# Patient Record
Sex: FEMALE | Race: WHITE | URBAN - METROPOLITAN AREA
[De-identification: names, ages, dates, MRNs, and addresses within clinical notes are randomized per-mention and may not be internally consistent; named-entity substitution may affect disease eponyms.]

---

## 2017-07-27 ENCOUNTER — HOSPITAL LABORATORY (OUTPATIENT)
Dept: OTHER | Facility: CLINIC | Age: 52
End: 2017-07-27

## 2017-07-27 ENCOUNTER — HOSPITAL ENCOUNTER (OUTPATIENT)
Dept: LAB | Facility: CLINIC | Age: 52
Discharge: HOME | End: 2017-07-27
Attending: ORTHOPAEDIC SURGERY | Admitting: ORTHOPAEDIC SURGERY
Payer: COMMERCIAL

## 2017-07-27 DIAGNOSIS — Z01.812 PRE-OPERATIVE LABORATORY EXAMINATION: ICD-10-CM

## 2017-07-27 LAB
CREAT SERPL-MCNC: 0.79 MG/DL (ref 0.52–1.04)
ERYTHROCYTE [DISTWIDTH] IN BLOOD BY AUTOMATED COUNT: 13.1 % (ref 10–15)
FERRITIN SERPL-MCNC: 60 NG/ML (ref 8–252)
GFR SERPL CREATININE-BSD FRML MDRD: 77 ML/MIN/1.7M2
HCT VFR BLD AUTO: 38.3 % (ref 35–47)
HGB BLD-MCNC: 12.6 G/DL (ref 11.7–15.7)
HGB BLD-MCNC: 12.8 G/DL (ref 11.7–15.7)
IRON SATN MFR SERPL: 26 % (ref 15–46)
IRON SERPL-MCNC: 74 UG/DL (ref 35–180)
MCH RBC QN AUTO: 31.3 PG (ref 26.5–33)
MCHC RBC AUTO-ENTMCNC: 33.4 G/DL (ref 31.5–36.5)
MCV RBC AUTO: 94 FL (ref 78–100)
PLATELET # BLD AUTO: 277 10E9/L (ref 150–450)
RBC # BLD AUTO: 4.09 10E12/L (ref 3.8–5.2)
RETICS # AUTO: 56.9 10E9/L (ref 25–95)
RETICS/RBC NFR AUTO: 1.4 % (ref 0.5–2)
TIBC SERPL-MCNC: 286 UG/DL (ref 240–430)
WBC # BLD AUTO: 6.1 10E9/L (ref 4–11)

## 2017-07-27 PROCEDURE — 87640 STAPH A DNA AMP PROBE: CPT | Performed by: ORTHOPAEDIC SURGERY

## 2017-07-27 PROCEDURE — 40000829 ZZHCL STATISTIC STAPH AUREUS SUSCEPT SCREEN PCR: Performed by: ORTHOPAEDIC SURGERY

## 2017-07-27 PROCEDURE — 87641 MR-STAPH DNA AMP PROBE: CPT | Performed by: ORTHOPAEDIC SURGERY

## 2017-07-27 PROCEDURE — 40000830 ZZHCL STATISTIC STAPH AUREUS METH RESIST SCREEN PCR: Performed by: ORTHOPAEDIC SURGERY

## 2017-07-28 LAB
MRSA DNA SPEC QL NAA+PROBE: NORMAL
SPECIMEN SOURCE: NORMAL

## 2017-08-07 RX ORDER — FERROUS SULFATE 325(65) MG
325 TABLET ORAL EVERY OTHER DAY
COMMUNITY

## 2017-08-21 NOTE — PHARMACY-ADMISSION MEDICATION HISTORY
Pharmacy reviewed prior to admission med list from pre-admitting rn, BONNIE Carrasco.       Prior to Admission medications    Medication Sig Last Dose Taking? Auth Provider   Naproxen Sodium (ALEVE PO) Take 220 mg by mouth 4 times daily as needed for moderate pain  Yes Reported, Patient   ferrous sulfate (IRON) 325 (65 FE) MG tablet Take 325 mg by mouth every other day  Yes Reported, Patient   Ascorbic Acid (VITAMIN C PO) Take 250 mg by mouth every other day   Yes Reported, Patient

## 2017-08-25 ENCOUNTER — HOSPITAL ENCOUNTER (OUTPATIENT)
Dept: LAB | Facility: CLINIC | Age: 52
Discharge: HOME | End: 2017-08-25
Attending: ORTHOPAEDIC SURGERY | Admitting: ORTHOPAEDIC SURGERY
Payer: COMMERCIAL

## 2017-08-25 DIAGNOSIS — Z01.812 PRE-OPERATIVE LABORATORY EXAMINATION: ICD-10-CM

## 2017-08-25 LAB
ABO + RH BLD: NORMAL
ABO + RH BLD: NORMAL
BLD GP AB SCN SERPL QL: NORMAL
BLOOD BANK CMNT PATIENT-IMP: NORMAL
SPECIMEN EXP DATE BLD: NORMAL

## 2017-08-25 PROCEDURE — 86900 BLOOD TYPING SEROLOGIC ABO: CPT | Performed by: ORTHOPAEDIC SURGERY

## 2017-08-25 PROCEDURE — 36415 COLL VENOUS BLD VENIPUNCTURE: CPT | Performed by: ORTHOPAEDIC SURGERY

## 2017-08-25 PROCEDURE — 86850 RBC ANTIBODY SCREEN: CPT | Performed by: ORTHOPAEDIC SURGERY

## 2017-08-25 PROCEDURE — 86901 BLOOD TYPING SEROLOGIC RH(D): CPT | Performed by: ORTHOPAEDIC SURGERY

## 2017-08-28 ENCOUNTER — HOSPITAL ENCOUNTER (INPATIENT)
Facility: CLINIC | Age: 52
LOS: 2 days | Discharge: HOME | DRG: 470 | End: 2017-08-30
Attending: ORTHOPAEDIC SURGERY | Admitting: ORTHOPAEDIC SURGERY
Payer: COMMERCIAL

## 2017-08-28 ENCOUNTER — APPOINTMENT (OUTPATIENT)
Dept: GENERAL RADIOLOGY | Facility: CLINIC | Age: 52
DRG: 470 | End: 2017-08-28
Attending: ORTHOPAEDIC SURGERY
Payer: COMMERCIAL

## 2017-08-28 ENCOUNTER — ANESTHESIA EVENT (OUTPATIENT)
Dept: SURGERY | Facility: CLINIC | Age: 52
DRG: 470 | End: 2017-08-28
Payer: COMMERCIAL

## 2017-08-28 ENCOUNTER — ANESTHESIA (OUTPATIENT)
Dept: SURGERY | Facility: CLINIC | Age: 52
DRG: 470 | End: 2017-08-28
Payer: COMMERCIAL

## 2017-08-28 DIAGNOSIS — Z96.641 S/P TOTAL HIP ARTHROPLASTY, RIGHT: Primary | ICD-10-CM

## 2017-08-28 PROBLEM — Z96.649 S/P TOTAL HIP ARTHROPLASTY: Status: ACTIVE | Noted: 2017-08-28

## 2017-08-28 PROCEDURE — 25000128 H RX IP 250 OP 636: Performed by: NURSE ANESTHETIST, CERTIFIED REGISTERED

## 2017-08-28 PROCEDURE — 36000093 ZZH SURGERY LEVEL 4 1ST 30 MIN: Performed by: ORTHOPAEDIC SURGERY

## 2017-08-28 PROCEDURE — 27210794 ZZH OR GENERAL SUPPLY STERILE: Performed by: ORTHOPAEDIC SURGERY

## 2017-08-28 PROCEDURE — 25000125 ZZHC RX 250: Performed by: NURSE ANESTHETIST, CERTIFIED REGISTERED

## 2017-08-28 PROCEDURE — 0SR904A REPLACEMENT OF RIGHT HIP JOINT WITH CERAMIC ON POLYETHYLENE SYNTHETIC SUBSTITUTE, UNCEMENTED, OPEN APPROACH: ICD-10-PCS | Performed by: ORTHOPAEDIC SURGERY

## 2017-08-28 PROCEDURE — 71000012 ZZH RECOVERY PHASE 1 LEVEL 1 FIRST HR: Performed by: ORTHOPAEDIC SURGERY

## 2017-08-28 PROCEDURE — 37000008 ZZH ANESTHESIA TECHNICAL FEE, 1ST 30 MIN: Performed by: ORTHOPAEDIC SURGERY

## 2017-08-28 PROCEDURE — 25000132 ZZH RX MED GY IP 250 OP 250 PS 637: Performed by: ORTHOPAEDIC SURGERY

## 2017-08-28 PROCEDURE — C1776 JOINT DEVICE (IMPLANTABLE): HCPCS | Performed by: ORTHOPAEDIC SURGERY

## 2017-08-28 PROCEDURE — 25000566 ZZH SEVOFLURANE, EA 15 MIN: Performed by: ORTHOPAEDIC SURGERY

## 2017-08-28 PROCEDURE — 25000125 ZZHC RX 250: Performed by: ORTHOPAEDIC SURGERY

## 2017-08-28 PROCEDURE — 25000128 H RX IP 250 OP 636: Performed by: ANESTHESIOLOGY

## 2017-08-28 PROCEDURE — 12000007 ZZH R&B INTERMEDIATE

## 2017-08-28 PROCEDURE — 36000063 ZZH SURGERY LEVEL 4 EA 15 ADDTL MIN: Performed by: ORTHOPAEDIC SURGERY

## 2017-08-28 PROCEDURE — 25000125 ZZHC RX 250: Performed by: PHYSICIAN ASSISTANT

## 2017-08-28 PROCEDURE — 25000128 H RX IP 250 OP 636: Performed by: PHYSICIAN ASSISTANT

## 2017-08-28 PROCEDURE — 37000009 ZZH ANESTHESIA TECHNICAL FEE, EACH ADDTL 15 MIN: Performed by: ORTHOPAEDIC SURGERY

## 2017-08-28 PROCEDURE — 25000128 H RX IP 250 OP 636: Performed by: ORTHOPAEDIC SURGERY

## 2017-08-28 PROCEDURE — 40000306 ZZH STATISTIC PRE PROC ASSESS II: Performed by: ORTHOPAEDIC SURGERY

## 2017-08-28 PROCEDURE — 27210995 ZZH RX 272: Performed by: ORTHOPAEDIC SURGERY

## 2017-08-28 PROCEDURE — 73502 X-RAY EXAM HIP UNI 2-3 VIEWS: CPT

## 2017-08-28 PROCEDURE — 25800025 ZZH RX 258: Performed by: ORTHOPAEDIC SURGERY

## 2017-08-28 PROCEDURE — 40000985 XR HIP PORT RT 1 VW

## 2017-08-28 DEVICE — IMP SHELL ACETABULUM HOWM 52MM 542-11-52E: Type: IMPLANTABLE DEVICE | Site: HIP | Status: FUNCTIONAL

## 2017-08-28 DEVICE — IMP SCR BONE STRK TORX 6.5X25MM CAN 2030-6525-1: Type: IMPLANTABLE DEVICE | Site: HIP | Status: FUNCTIONAL

## 2017-08-28 DEVICE — IMP HEAD FEMORAL STRK BIOLOX DELTA CERAMIC 36MM +0MM: Type: IMPLANTABLE DEVICE | Site: HIP | Status: FUNCTIONAL

## 2017-08-28 DEVICE — IMP STEM FEM STRK ACCOLADE II SZ 4 NCK 132DEG 6720-0435: Type: IMPLANTABLE DEVICE | Site: HIP | Status: FUNCTIONAL

## 2017-08-28 DEVICE — IMP LINER STRK TRIDENT X3 POLY 36MM 10DEG SZ E 623-10-36E: Type: IMPLANTABLE DEVICE | Site: HIP | Status: FUNCTIONAL

## 2017-08-28 RX ORDER — LIDOCAINE HYDROCHLORIDE 10 MG/ML
INJECTION, SOLUTION INFILTRATION; PERINEURAL PRN
Status: DISCONTINUED | OUTPATIENT
Start: 2017-08-28 | End: 2017-08-28

## 2017-08-28 RX ORDER — ACETAMINOPHEN 325 MG/1
650 TABLET ORAL EVERY 4 HOURS PRN
Status: DISCONTINUED | OUTPATIENT
Start: 2017-08-31 | End: 2017-08-30 | Stop reason: HOSPADM

## 2017-08-28 RX ORDER — ONDANSETRON 2 MG/ML
4 INJECTION INTRAMUSCULAR; INTRAVENOUS EVERY 30 MIN PRN
Status: DISCONTINUED | OUTPATIENT
Start: 2017-08-28 | End: 2017-08-28 | Stop reason: HOSPADM

## 2017-08-28 RX ORDER — BUPIVACAINE HYDROCHLORIDE AND EPINEPHRINE 2.5; 5 MG/ML; UG/ML
INJECTION, SOLUTION INFILTRATION; PERINEURAL PRN
Status: DISCONTINUED | OUTPATIENT
Start: 2017-08-28 | End: 2017-08-28 | Stop reason: HOSPADM

## 2017-08-28 RX ORDER — ACETAMINOPHEN 325 MG/1
650 TABLET ORAL EVERY 4 HOURS PRN
Status: DISCONTINUED | OUTPATIENT
Start: 2017-08-31 | End: 2017-08-28

## 2017-08-28 RX ORDER — MORPHINE SULFATE 15 MG/1
15 TABLET, FILM COATED, EXTENDED RELEASE ORAL EVERY 12 HOURS
Status: DISCONTINUED | OUTPATIENT
Start: 2017-08-28 | End: 2017-08-30 | Stop reason: HOSPADM

## 2017-08-28 RX ORDER — KETAMINE HYDROCHLORIDE 10 MG/ML
INJECTION, SOLUTION INTRAMUSCULAR; INTRAVENOUS PRN
Status: DISCONTINUED | OUTPATIENT
Start: 2017-08-28 | End: 2017-08-28

## 2017-08-28 RX ORDER — CEFAZOLIN SODIUM 2 G/100ML
2 INJECTION, SOLUTION INTRAVENOUS EVERY 8 HOURS
Status: COMPLETED | OUTPATIENT
Start: 2017-08-28 | End: 2017-08-29

## 2017-08-28 RX ORDER — ONDANSETRON 2 MG/ML
4 INJECTION INTRAMUSCULAR; INTRAVENOUS EVERY 6 HOURS PRN
Status: DISCONTINUED | OUTPATIENT
Start: 2017-08-28 | End: 2017-08-30 | Stop reason: HOSPADM

## 2017-08-28 RX ORDER — FENTANYL CITRATE 50 UG/ML
25-50 INJECTION, SOLUTION INTRAMUSCULAR; INTRAVENOUS
Status: DISCONTINUED | OUTPATIENT
Start: 2017-08-28 | End: 2017-08-28 | Stop reason: HOSPADM

## 2017-08-28 RX ORDER — PROPOFOL 10 MG/ML
INJECTION, EMULSION INTRAVENOUS PRN
Status: DISCONTINUED | OUTPATIENT
Start: 2017-08-28 | End: 2017-08-28

## 2017-08-28 RX ORDER — CEFAZOLIN SODIUM 2 G/100ML
2 INJECTION, SOLUTION INTRAVENOUS
Status: COMPLETED | OUTPATIENT
Start: 2017-08-28 | End: 2017-08-28

## 2017-08-28 RX ORDER — HYDRALAZINE HYDROCHLORIDE 20 MG/ML
2.5-5 INJECTION INTRAMUSCULAR; INTRAVENOUS EVERY 10 MIN PRN
Status: DISCONTINUED | OUTPATIENT
Start: 2017-08-28 | End: 2017-08-28 | Stop reason: HOSPADM

## 2017-08-28 RX ORDER — DEXTROSE MONOHYDRATE, SODIUM CHLORIDE, AND POTASSIUM CHLORIDE 50; 1.49; 4.5 G/1000ML; G/1000ML; G/1000ML
INJECTION, SOLUTION INTRAVENOUS CONTINUOUS
Status: DISCONTINUED | OUTPATIENT
Start: 2017-08-28 | End: 2017-08-30 | Stop reason: HOSPADM

## 2017-08-28 RX ORDER — HYDROMORPHONE HYDROCHLORIDE 1 MG/ML
.3-.5 INJECTION, SOLUTION INTRAMUSCULAR; INTRAVENOUS; SUBCUTANEOUS EVERY 5 MIN PRN
Status: DISCONTINUED | OUTPATIENT
Start: 2017-08-28 | End: 2017-08-28 | Stop reason: HOSPADM

## 2017-08-28 RX ORDER — NEOSTIGMINE METHYLSULFATE 1 MG/ML
VIAL (ML) INJECTION PRN
Status: DISCONTINUED | OUTPATIENT
Start: 2017-08-28 | End: 2017-08-28

## 2017-08-28 RX ORDER — FENTANYL CITRATE 50 UG/ML
INJECTION, SOLUTION INTRAMUSCULAR; INTRAVENOUS PRN
Status: DISCONTINUED | OUTPATIENT
Start: 2017-08-28 | End: 2017-08-28

## 2017-08-28 RX ORDER — CEFAZOLIN SODIUM 1 G/3ML
1 INJECTION, POWDER, FOR SOLUTION INTRAMUSCULAR; INTRAVENOUS SEE ADMIN INSTRUCTIONS
Status: DISCONTINUED | OUTPATIENT
Start: 2017-08-28 | End: 2017-08-28 | Stop reason: HOSPADM

## 2017-08-28 RX ORDER — ACETAMINOPHEN 325 MG/1
975 TABLET ORAL EVERY 8 HOURS
Status: DISCONTINUED | OUTPATIENT
Start: 2017-08-28 | End: 2017-08-30 | Stop reason: HOSPADM

## 2017-08-28 RX ORDER — AMOXICILLIN 250 MG
1-2 CAPSULE ORAL 2 TIMES DAILY
Status: DISCONTINUED | OUTPATIENT
Start: 2017-08-28 | End: 2017-08-30 | Stop reason: HOSPADM

## 2017-08-28 RX ORDER — DIAZEPAM 5 MG
5 TABLET ORAL EVERY 6 HOURS PRN
Status: DISCONTINUED | OUTPATIENT
Start: 2017-08-28 | End: 2017-08-30 | Stop reason: HOSPADM

## 2017-08-28 RX ORDER — PROPOFOL 10 MG/ML
INJECTION, EMULSION INTRAVENOUS CONTINUOUS PRN
Status: DISCONTINUED | OUTPATIENT
Start: 2017-08-28 | End: 2017-08-28

## 2017-08-28 RX ORDER — GLYCOPYRROLATE 0.2 MG/ML
INJECTION, SOLUTION INTRAMUSCULAR; INTRAVENOUS PRN
Status: DISCONTINUED | OUTPATIENT
Start: 2017-08-28 | End: 2017-08-28

## 2017-08-28 RX ORDER — OXYCODONE HYDROCHLORIDE 5 MG/1
5-10 TABLET ORAL
Status: DISCONTINUED | OUTPATIENT
Start: 2017-08-28 | End: 2017-08-30 | Stop reason: HOSPADM

## 2017-08-28 RX ORDER — NALOXONE HYDROCHLORIDE 0.4 MG/ML
.1-.4 INJECTION, SOLUTION INTRAMUSCULAR; INTRAVENOUS; SUBCUTANEOUS
Status: DISCONTINUED | OUTPATIENT
Start: 2017-08-28 | End: 2017-08-30 | Stop reason: HOSPADM

## 2017-08-28 RX ORDER — ONDANSETRON 2 MG/ML
INJECTION INTRAMUSCULAR; INTRAVENOUS PRN
Status: DISCONTINUED | OUTPATIENT
Start: 2017-08-28 | End: 2017-08-28

## 2017-08-28 RX ORDER — ACETAMINOPHEN 325 MG/1
975 TABLET ORAL EVERY 8 HOURS
Status: DISCONTINUED | OUTPATIENT
Start: 2017-08-28 | End: 2017-08-28

## 2017-08-28 RX ORDER — LIDOCAINE 40 MG/G
CREAM TOPICAL
Status: DISCONTINUED | OUTPATIENT
Start: 2017-08-28 | End: 2017-08-30 | Stop reason: HOSPADM

## 2017-08-28 RX ORDER — DEXAMETHASONE SODIUM PHOSPHATE 4 MG/ML
INJECTION, SOLUTION INTRA-ARTICULAR; INTRALESIONAL; INTRAMUSCULAR; INTRAVENOUS; SOFT TISSUE PRN
Status: DISCONTINUED | OUTPATIENT
Start: 2017-08-28 | End: 2017-08-28

## 2017-08-28 RX ORDER — OXYCODONE HYDROCHLORIDE 5 MG/1
5-10 TABLET ORAL
Status: DISCONTINUED | OUTPATIENT
Start: 2017-08-28 | End: 2017-08-28

## 2017-08-28 RX ORDER — SODIUM CHLORIDE, SODIUM LACTATE, POTASSIUM CHLORIDE, CALCIUM CHLORIDE 600; 310; 30; 20 MG/100ML; MG/100ML; MG/100ML; MG/100ML
INJECTION, SOLUTION INTRAVENOUS CONTINUOUS
Status: DISCONTINUED | OUTPATIENT
Start: 2017-08-28 | End: 2017-08-28 | Stop reason: HOSPADM

## 2017-08-28 RX ORDER — HYDROXYZINE HYDROCHLORIDE 25 MG/1
25 TABLET, FILM COATED ORAL EVERY 6 HOURS PRN
Status: DISCONTINUED | OUTPATIENT
Start: 2017-08-28 | End: 2017-08-30 | Stop reason: HOSPADM

## 2017-08-28 RX ORDER — DIMENHYDRINATE 50 MG/ML
25 INJECTION, SOLUTION INTRAMUSCULAR; INTRAVENOUS
Status: DISCONTINUED | OUTPATIENT
Start: 2017-08-28 | End: 2017-08-28 | Stop reason: HOSPADM

## 2017-08-28 RX ORDER — ONDANSETRON 4 MG/1
4 TABLET, ORALLY DISINTEGRATING ORAL EVERY 30 MIN PRN
Status: DISCONTINUED | OUTPATIENT
Start: 2017-08-28 | End: 2017-08-28 | Stop reason: HOSPADM

## 2017-08-28 RX ORDER — LABETALOL HYDROCHLORIDE 5 MG/ML
10 INJECTION, SOLUTION INTRAVENOUS
Status: DISCONTINUED | OUTPATIENT
Start: 2017-08-28 | End: 2017-08-28 | Stop reason: HOSPADM

## 2017-08-28 RX ORDER — ONDANSETRON 4 MG/1
4 TABLET, ORALLY DISINTEGRATING ORAL EVERY 6 HOURS PRN
Status: DISCONTINUED | OUTPATIENT
Start: 2017-08-28 | End: 2017-08-30 | Stop reason: HOSPADM

## 2017-08-28 RX ORDER — LIDOCAINE 40 MG/G
CREAM TOPICAL
Status: DISCONTINUED | OUTPATIENT
Start: 2017-08-28 | End: 2017-08-28 | Stop reason: HOSPADM

## 2017-08-28 RX ADMIN — POTASSIUM CHLORIDE, DEXTROSE MONOHYDRATE AND SODIUM CHLORIDE: 150; 5; 450 INJECTION, SOLUTION INTRAVENOUS at 16:32

## 2017-08-28 RX ADMIN — MORPHINE SULFATE 15 MG: 15 TABLET, EXTENDED RELEASE ORAL at 18:56

## 2017-08-28 RX ADMIN — KETAMINE HCL-NACL SOLN PREF SY 50 MG/5ML-0.9% (10MG/ML) 20 MG: 10 SOLUTION PREFILLED SYRINGE at 13:08

## 2017-08-28 RX ADMIN — CEFAZOLIN SODIUM 2 G: 2 INJECTION, SOLUTION INTRAVENOUS at 12:59

## 2017-08-28 RX ADMIN — PROPOFOL 50 MCG/KG/MIN: 10 INJECTION, EMULSION INTRAVENOUS at 13:13

## 2017-08-28 RX ADMIN — KETAMINE HCL-NACL SOLN PREF SY 50 MG/5ML-0.9% (10MG/ML) 10 MG: 10 SOLUTION PREFILLED SYRINGE at 13:24

## 2017-08-28 RX ADMIN — Medication 1 G: at 14:43

## 2017-08-28 RX ADMIN — SODIUM CHLORIDE, POTASSIUM CHLORIDE, SODIUM LACTATE AND CALCIUM CHLORIDE: 600; 310; 30; 20 INJECTION, SOLUTION INTRAVENOUS at 12:59

## 2017-08-28 RX ADMIN — KETAMINE HCL-NACL SOLN PREF SY 50 MG/5ML-0.9% (10MG/ML) 20 MG: 10 SOLUTION PREFILLED SYRINGE at 13:18

## 2017-08-28 RX ADMIN — MIDAZOLAM HYDROCHLORIDE 2 MG: 1 INJECTION, SOLUTION INTRAMUSCULAR; INTRAVENOUS at 12:59

## 2017-08-28 RX ADMIN — HYDROMORPHONE HYDROCHLORIDE 1 MG: 1 INJECTION, SOLUTION INTRAMUSCULAR; INTRAVENOUS; SUBCUTANEOUS at 13:20

## 2017-08-28 RX ADMIN — ROCURONIUM BROMIDE 35 MG: 10 INJECTION INTRAVENOUS at 13:01

## 2017-08-28 RX ADMIN — ONDANSETRON 4 MG: 2 INJECTION INTRAMUSCULAR; INTRAVENOUS at 13:01

## 2017-08-28 RX ADMIN — GLYCOPYRROLATE 0.2 MG: 0.2 INJECTION, SOLUTION INTRAMUSCULAR; INTRAVENOUS at 13:01

## 2017-08-28 RX ADMIN — ACETAMINOPHEN 975 MG: 325 TABLET, FILM COATED ORAL at 17:26

## 2017-08-28 RX ADMIN — DEXAMETHASONE SODIUM PHOSPHATE 4 MG: 4 INJECTION, SOLUTION INTRA-ARTICULAR; INTRALESIONAL; INTRAMUSCULAR; INTRAVENOUS; SOFT TISSUE at 13:01

## 2017-08-28 RX ADMIN — PROPOFOL 200 MG: 10 INJECTION, EMULSION INTRAVENOUS at 13:01

## 2017-08-28 RX ADMIN — Medication 3 MG: at 15:02

## 2017-08-28 RX ADMIN — HYDROMORPHONE HYDROCHLORIDE 0.5 MG: 1 INJECTION, SOLUTION INTRAMUSCULAR; INTRAVENOUS; SUBCUTANEOUS at 13:33

## 2017-08-28 RX ADMIN — FENTANYL CITRATE 100 MCG: 50 INJECTION, SOLUTION INTRAMUSCULAR; INTRAVENOUS at 13:01

## 2017-08-28 RX ADMIN — HYDROXYZINE HYDROCHLORIDE 25 MG: 25 TABLET ORAL at 22:16

## 2017-08-28 RX ADMIN — SENNOSIDES AND DOCUSATE SODIUM 1 TABLET: 8.6; 5 TABLET ORAL at 20:44

## 2017-08-28 RX ADMIN — Medication 1 G: at 13:12

## 2017-08-28 RX ADMIN — GLYCOPYRROLATE 0.4 MG: 0.2 INJECTION, SOLUTION INTRAMUSCULAR; INTRAVENOUS at 15:02

## 2017-08-28 RX ADMIN — HYDROMORPHONE HYDROCHLORIDE 0.5 MG: 1 INJECTION, SOLUTION INTRAMUSCULAR; INTRAVENOUS; SUBCUTANEOUS at 15:21

## 2017-08-28 RX ADMIN — CEFAZOLIN SODIUM 2 G: 2 INJECTION, SOLUTION INTRAVENOUS at 20:44

## 2017-08-28 RX ADMIN — SODIUM CHLORIDE, POTASSIUM CHLORIDE, SODIUM LACTATE AND CALCIUM CHLORIDE: 600; 310; 30; 20 INJECTION, SOLUTION INTRAVENOUS at 14:10

## 2017-08-28 RX ADMIN — LIDOCAINE HYDROCHLORIDE 50 MG: 10 INJECTION, SOLUTION INFILTRATION; PERINEURAL at 13:01

## 2017-08-28 RX ADMIN — CEFAZOLIN 1 G: 1 INJECTION, POWDER, FOR SOLUTION INTRAMUSCULAR; INTRAVENOUS at 14:59

## 2017-08-28 ASSESSMENT — ENCOUNTER SYMPTOMS
SEIZURES: 0
DYSRHYTHMIAS: 0
STRIDOR: 0

## 2017-08-28 ASSESSMENT — COPD QUESTIONNAIRES: COPD: 0

## 2017-08-28 ASSESSMENT — LIFESTYLE VARIABLES: TOBACCO_USE: 0

## 2017-08-28 NOTE — BRIEF OP NOTE
[facility] Brief Operative Note    Pre-operative diagnosis: Right Hip DJD   Post-operative diagnosis same   Procedure: Procedure(s):  ARTHROPLASTY HIP RIGHT      Choice Anesthesia - Wound Class: I-Clean   Surgeon(s): Surgeon(s) and Role:     * Christoph Lopez MD - Primary     * Marlys Pacheco PA - Assisting   Estimated blood loss: 150 mL    Specimens: * No specimens in log *   Findings: Djd,52,4,+0/36

## 2017-08-28 NOTE — IP AVS SNAPSHOT
MRN:[id number]                      After Visit Summary   8/28/2017    Alpa Vides    MRN: [id number]           Thank you!     Thank you for choosing [facility] for your care. Our goal is always to provide you with excellent care. Hearing back from our patients is one way we can continue to improve our services. Please take a few minutes to complete the written survey that you may receive in the mail after you visit. If you would like to speak to someone directly about your visit please contact Patient Relations at [phone]. Thank you!          Patient Information     Date Of Birth          1965        Designated Caregiver       Most Recent Value    Caregiver    Will someone help with your care after discharge? yes    Name of designated caregiver Jarred ()    Phone number of caregiver [phone]    Caregiver address same as pt      About your hospital stay     You were admitted on:  August 28, 2017 You last received care in the:  [facility]    You were discharged on:  August 30, 2017        Reason for your hospital stay       Right total hip replacement.                  Who to Call     For medical emergencies, please call 911.  For non-urgent questions about your medical care, please call your primary care provider or clinic, [phone]  For questions related to your surgery, please call your surgery clinic        Attending Provider     Provider Specialty    Christoph Lopez MD Orthopedics       Primary Care Provider Office Phone # Fax #    Rosette Augustine [phone]      After Care Instructions     Activity       Your activity upon discharge: ambulate with your walker. No active abduction of the hip.            Diet       Follow this diet upon discharge: Regular            Wound care and dressings       Instructions to care for your wound at home: keep wound clean and dry. Keep dressing in place.                  Follow-up  "Appointments     Follow-up and recommended labs and tests        Follow-up with German Pacheco PA-C in 2 weeks.                  Further instructions from your care team       After your hysterectomy, call your physician if you have:  1. Fever greater than 100.5 degrees.  2. Bleeding that saturates more than one pad in one hour.  3. Unusual incisional drainage or redness.  4. Pain not controlled with oral pain medications.  5. If no bowel movement in three days try Milk of Magnesia or any over the counter remedy.  6. Any other questions or concerns related to your surgery or recovery.    Thank you for allowing [facility] to participate in your cares!    Pending Results     No orders found from 8/26/2017 to 8/29/2017.            Statement of Approval     Ordered          08/30/17 0850  I have reviewed and agree with all the recommendations and orders detailed in this document.  EFFECTIVE NOW     Approved and electronically signed by:  Marlys Pacheco PA             Admission Information     Date & Time Provider Department Dept. Phone    8/28/2017 Christoph Lopez MD [facility] [phone]      Your Vitals Were     Blood Pressure Pulse Temperature Respirations Height Weight    111/55 (BP Location: Left arm) 81 98.9  F (37.2  C) (Oral) 16 1.626 m (5' 4\") 69.9 kg (154 lb)    Pulse Oximetry BMI (Body Mass Index)                95% 26.43 kg/m2          MyChart Information     [facility] lets you send messages to your doctor, view your test results, renew your prescriptions, schedule appointments and more. To sign up, go to [email or web address] . Click on \"Log in\" on the left side of the screen, which will take you to the Welcome page. Then click on \"Sign up Now\" on the right side of the page.     You will be asked to enter the access code listed below, as well as some personal information. Please follow the directions to create your username and password.     Your access code is: " [email or web address]  Expires: 2017  9:43 AM     Your access code will  in 90 days. If you need help or a new code, please call your [facility] clinic or [phone].        Care EveryWhere ID     This is your Care EveryWhere ID. This could be used by other organizations to access your [facility] medical records  [id number]        Equal Access to Services     [facility] : Hadii aad ku hadasho Soomaali, waaxda luqadaha, qaybta kaalmada adeegyada, waxay sherleyin hayaan adebecca sangita labishnu . So [facility] [phone].    ATENCIÓN: Si habla español, tiene a doty disposición servicios gratuitos de asistencia lingüística. Llame al [phone].    We comply with applicable federal civil rights laws and [place] laws. We do not discriminate on the basis of race, color, national origin, age, disability sex, sexual orientation or gender identity.               Review of your medicines      UNREVIEWED medicines. Ask your doctor about these medicines        Dose / Directions    ferrous sulfate 325 (65 FE) MG tablet   Commonly known as:  IRON   Notes to Patient:  Resume per home schedule        Dose:  325 mg   Take 325 mg by mouth every other day   Refills:  0       VITAMIN C PO   Notes to Patient:  Resume per home schedule        Dose:  250 mg   Take 250 mg by mouth every other day   Refills:  0         START taking        Dose / Directions    acetaminophen 325 MG tablet   Commonly known as:  TYLENOL   Notes to Patient:  Next available tonight at 6 PM  No more than 4,000 mg daily        Dose:  650 mg   Start taking on:  2017   Take 2 tablets (650 mg) by mouth every 4 hours as needed for other (surgical pain)   Quantity:  100 tablet   Refills:  1       aspirin 325 MG EC tablet   Notes to Patient:  Thursday morning        Dose:  325 mg   Start taking on:  2017   Take 1 tablet (325 mg) by mouth 2 times daily (with meals)   Quantity:  120 tablet   Refills:  0       hydrOXYzine 25 MG tablet   Commonly known as:  ATARAX    Notes to Patient:  Next available today at 8:10 PM        Dose:  25 mg   Take 1 tablet (25 mg) by mouth every 6 hours as needed for itching   Quantity:  120 tablet   Refills:  0       oxyCODONE 5 MG IR tablet   Commonly known as:  ROXICODONE   Notes to Patient:  Next available today at 5:15 PM        Dose:  5-10 mg   Take 1-2 tablets (5-10 mg) by mouth every 3 hours as needed for moderate to severe pain   Quantity:  80 tablet   Refills:  0         STOP taking     ALEVE PO                Where to get your medicines      These medications were sent to [place], [place] - [address] [facility]  [address] [facility] [address]     Phone:  [phone]     acetaminophen 325 MG tablet    aspirin 325 MG EC tablet    hydrOXYzine 25 MG tablet         Some of these will need a paper prescription and others can be bought over the counter. Ask your nurse if you have questions.     Bring a paper prescription for each of these medications     oxyCODONE 5 MG IR tablet                Protect others around you: Learn how to safely use, store and throw away your medicines at www.disposemymeds.org.             Medication List: This is a list of all your medications and when to take them. Check marks below indicate your daily home schedule. Keep this list as a reference.      Medications           Morning Afternoon Evening Bedtime As Needed    acetaminophen 325 MG tablet   Commonly known as:  TYLENOL   Take 2 tablets (650 mg) by mouth every 4 hours as needed for other (surgical pain)   Start taking on:  8/31/2017   Last time this was given:  975 mg on 8/30/2017 10:00 AM   Notes to Patient:  Next available tonight at 6 PM  No more than 4,000 mg daily                                   aspirin 325 MG EC tablet   Take 1 tablet (325 mg) by mouth 2 times daily (with meals)   Start taking on:  8/31/2017   Notes to Patient:  Thursday morning                                      ferrous sulfate 325 (65  FE) MG tablet   Commonly known as:  IRON   Take 325 mg by mouth every other day   Notes to Patient:  Resume per home schedule                                hydrOXYzine 25 MG tablet   Commonly known as:  ATARAX   Take 1 tablet (25 mg) by mouth every 6 hours as needed for itching   Last time this was given:  25 mg on 8/30/2017  2:12 PM   Notes to Patient:  Next available today at 8:10 PM                                   oxyCODONE 5 MG IR tablet   Commonly known as:  ROXICODONE   Take 1-2 tablets (5-10 mg) by mouth every 3 hours as needed for moderate to severe pain   Last time this was given:  5 mg on 8/30/2017  5:01 PM   Notes to Patient:  Next available today at 5:15 PM                                   VITAMIN C PO   Take 250 mg by mouth every other day   Notes to Patient:  Resume per home schedule

## 2017-08-28 NOTE — ANESTHESIA CARE TRANSFER NOTE
Patient: Alpa Vides    Procedure(s):  ARTHROPLASTY HIP RIGHT      Choice Anesthesia - Wound Class: I-Clean    Diagnosis: Right Hip DJD  Diagnosis Additional Information: No value filed.    Anesthesia Type:   General, ETT     Note:  Airway :Face Mask  Patient transferred to:PACU  Comments: To PACU, Awake, VSS, SV, O2, Report to RN      Vitals: (Last set prior to Anesthesia Care Transfer)    CRNA VITALS  8/28/2017 1450 - 8/28/2017 1525      8/28/2017             Pulse: 90    SpO2: 99 %    Resp Rate (observed): 27                Electronically Signed By: Dean Dennis Severson, APRN CRNA  August 28, 2017  3:25 PM

## 2017-08-28 NOTE — ANESTHESIA POSTPROCEDURE EVALUATION
Patient: Alpa Vides    Procedure(s):  ARTHROPLASTY HIP RIGHT      Choice Anesthesia - Wound Class: I-Clean    Diagnosis:Right Hip DJD  Diagnosis Additional Information: Right Hip DJD     Anesthesia Type:  General, ETT    Note:  Anesthesia Post Evaluation    Patient location during evaluation: PACU  Patient participation: Able to fully participate in evaluation  Level of consciousness: awake  Pain management: adequate  Airway patency: patent  Cardiovascular status: acceptable  Respiratory status: acceptable  Hydration status: acceptable  PONV: controlled     Anesthetic complications: None          Last vitals:  Vitals:    08/28/17 1136 08/28/17 1524 08/28/17 1525   BP: 123/76 127/74 127/78   Pulse: 68     Resp:  11 10   Temp: 97.2  F (36.2  C) 97.8  F (36.6  C)    SpO2: 98% 98% 100%         Electronically Signed By: Rinku Solomon DO  August 28, 2017  3:32 PM

## 2017-08-28 NOTE — ANESTHESIA PREPROCEDURE EVALUATION
Anesthesia Evaluation     . Pt has had prior anesthetic. Type: General    No history of anesthetic complications          ROS/MED HX    ENT/Pulmonary:  - neg pulmonary ROS    (-) tobacco use, asthma, COPD, ZANE risk factors and recent URI   Neurologic:  - neg neurologic ROS    (-) seizures and CVA   Cardiovascular:  - neg cardiovascular ROS   (+) ----. : . . . :. . Previous cardiac testing date:results:date: results:ECG reviewed date:8/17 results:NSR date: results:         (-) hypertension, CAD, arrhythmias and valvular problems/murmurs   METS/Exercise Tolerance:     Hematologic: Comments: Lab Test        07/27/17                       1736          WBC          6.1           HGB          12.8  12.6   MCV          94            PLT          277            Lab Test        07/27/17                       1736          CR           0.79         - neg hematologic  ROS       Musculoskeletal:   (+) arthritis, , , -       GI/Hepatic:  - neg GI/hepatic ROS      (-) GERD, hepatitis and liver disease   Renal/Genitourinary:  - ROS Renal section negative       Endo:  - neg endo ROS    (-) Type I DM, Type II DM, thyroid disease, chronic steroid usage and obesity   Psychiatric:  - neg psychiatric ROS       Infectious Disease:  - neg infectious disease ROS       Malignancy:      - no malignancy   Other:    - neg other ROS                 Physical Exam  Normal systems: cardiovascular, pulmonary and dental    Airway   Mallampati: I  TM distance: >3 FB  Neck ROM: full    Dental     Cardiovascular   Rhythm and rate: regular and normal  (-) no friction rub, no systolic click and no murmur    Pulmonary    breath sounds clear to auscultation(-) no rhonchi, no decreased breath sounds, no wheezes, no rales and no stridor                    Anesthesia Plan      History & Physical Review  History and physical reviewed and following examination; no interval change.    ASA Status:  1 .    NPO Status:  > 8 hours    Plan for General and ETT  with Intravenous induction. Maintenance will be Balanced.    PONV prophylaxis:  Ondansetron (or other 5HT-3) and Dexamethasone or Solumedrol       Postoperative Care  Postoperative pain management:  IV analgesics.      Consents  Anesthetic plan, risks, benefits and alternatives discussed with:  Patient or representative and Patient..                          .

## 2017-08-28 NOTE — IP AVS SNAPSHOT
[facility]    [address] [facility] [address]    Phone:  [phone]    Fax:  [phone]                                       After Visit Summary   8/28/2017    Alpa Vides    MRN: [id number]           After Visit Summary Signature Page     I have received my discharge instructions, and my questions have been answered. I have discussed any challenges I see with this plan with the nurse or doctor.    ..........................................................................................................................................  Patient/Patient Representative Signature      ..........................................................................................................................................  Patient Representative Print Name and Relationship to Patient    ..................................................               ................................................  Date                                            Time    ..........................................................................................................................................  Reviewed by Signature/Title    ...................................................              ..............................................  Date                                                            Time

## 2017-08-29 ENCOUNTER — APPOINTMENT (OUTPATIENT)
Dept: OCCUPATIONAL THERAPY | Facility: CLINIC | Age: 52
DRG: 470 | End: 2017-08-29
Attending: ORTHOPAEDIC SURGERY
Payer: COMMERCIAL

## 2017-08-29 ENCOUNTER — APPOINTMENT (OUTPATIENT)
Dept: PHYSICAL THERAPY | Facility: CLINIC | Age: 52
DRG: 470 | End: 2017-08-29
Attending: ORTHOPAEDIC SURGERY
Payer: COMMERCIAL

## 2017-08-29 LAB
GLUCOSE SERPL-MCNC: 128 MG/DL (ref 70–99)
HGB BLD-MCNC: 11.1 G/DL (ref 11.7–15.7)

## 2017-08-29 PROCEDURE — 97530 THERAPEUTIC ACTIVITIES: CPT | Mod: GP

## 2017-08-29 PROCEDURE — 25000132 ZZH RX MED GY IP 250 OP 250 PS 637: Performed by: ORTHOPAEDIC SURGERY

## 2017-08-29 PROCEDURE — 97535 SELF CARE MNGMENT TRAINING: CPT | Mod: GO | Performed by: STUDENT IN AN ORGANIZED HEALTH CARE EDUCATION/TRAINING PROGRAM

## 2017-08-29 PROCEDURE — 36415 COLL VENOUS BLD VENIPUNCTURE: CPT | Performed by: ORTHOPAEDIC SURGERY

## 2017-08-29 PROCEDURE — 99222 1ST HOSP IP/OBS MODERATE 55: CPT | Performed by: INTERNAL MEDICINE

## 2017-08-29 PROCEDURE — 40000193 ZZH STATISTIC PT WARD VISIT

## 2017-08-29 PROCEDURE — 97165 OT EVAL LOW COMPLEX 30 MIN: CPT | Mod: GO | Performed by: STUDENT IN AN ORGANIZED HEALTH CARE EDUCATION/TRAINING PROGRAM

## 2017-08-29 PROCEDURE — 85018 HEMOGLOBIN: CPT | Performed by: ORTHOPAEDIC SURGERY

## 2017-08-29 PROCEDURE — 97161 PT EVAL LOW COMPLEX 20 MIN: CPT | Mod: GP

## 2017-08-29 PROCEDURE — 12000000 ZZH R&B MED SURG/OB

## 2017-08-29 PROCEDURE — 40000133 ZZH STATISTIC OT WARD VISIT: Performed by: STUDENT IN AN ORGANIZED HEALTH CARE EDUCATION/TRAINING PROGRAM

## 2017-08-29 PROCEDURE — 25000128 H RX IP 250 OP 636: Performed by: ORTHOPAEDIC SURGERY

## 2017-08-29 PROCEDURE — 82947 ASSAY GLUCOSE BLOOD QUANT: CPT | Performed by: ORTHOPAEDIC SURGERY

## 2017-08-29 PROCEDURE — 97116 GAIT TRAINING THERAPY: CPT | Mod: GP

## 2017-08-29 PROCEDURE — 99207 ZZC CONSULT E&M CHANGED TO INITIAL LEVEL: CPT | Performed by: INTERNAL MEDICINE

## 2017-08-29 PROCEDURE — 97110 THERAPEUTIC EXERCISES: CPT | Mod: GP

## 2017-08-29 RX ADMIN — ACETAMINOPHEN 975 MG: 325 TABLET, FILM COATED ORAL at 09:32

## 2017-08-29 RX ADMIN — ACETAMINOPHEN 975 MG: 325 TABLET, FILM COATED ORAL at 00:43

## 2017-08-29 RX ADMIN — HYDROXYZINE HYDROCHLORIDE 25 MG: 25 TABLET ORAL at 04:24

## 2017-08-29 RX ADMIN — SENNOSIDES AND DOCUSATE SODIUM 2 TABLET: 8.6; 5 TABLET ORAL at 20:39

## 2017-08-29 RX ADMIN — ACETAMINOPHEN 975 MG: 325 TABLET, FILM COATED ORAL at 16:51

## 2017-08-29 RX ADMIN — MORPHINE SULFATE 15 MG: 15 TABLET, EXTENDED RELEASE ORAL at 18:19

## 2017-08-29 RX ADMIN — SENNOSIDES AND DOCUSATE SODIUM 1 TABLET: 8.6; 5 TABLET ORAL at 09:32

## 2017-08-29 RX ADMIN — OXYCODONE HYDROCHLORIDE 5 MG: 5 TABLET ORAL at 22:05

## 2017-08-29 RX ADMIN — OXYCODONE HYDROCHLORIDE 5 MG: 5 TABLET ORAL at 05:43

## 2017-08-29 RX ADMIN — RIVAROXABAN 10 MG: 10 TABLET, FILM COATED ORAL at 09:33

## 2017-08-29 RX ADMIN — MORPHINE SULFATE 15 MG: 15 TABLET, EXTENDED RELEASE ORAL at 06:22

## 2017-08-29 RX ADMIN — OXYCODONE HYDROCHLORIDE 5 MG: 5 TABLET ORAL at 15:26

## 2017-08-29 RX ADMIN — OXYCODONE HYDROCHLORIDE 5 MG: 5 TABLET ORAL at 12:30

## 2017-08-29 RX ADMIN — CEFAZOLIN SODIUM 2 G: 2 INJECTION, SOLUTION INTRAVENOUS at 04:27

## 2017-08-29 RX ADMIN — OXYCODONE HYDROCHLORIDE 5 MG: 5 TABLET ORAL at 09:32

## 2017-08-29 RX ADMIN — HYDROXYZINE HYDROCHLORIDE 25 MG: 25 TABLET ORAL at 10:27

## 2017-08-29 RX ADMIN — HYDROXYZINE HYDROCHLORIDE 25 MG: 25 TABLET ORAL at 16:51

## 2017-08-29 ASSESSMENT — PAIN DESCRIPTION - DESCRIPTORS
DESCRIPTORS: ACHING
DESCRIPTORS: ACHING

## 2017-08-29 ASSESSMENT — ACTIVITIES OF DAILY LIVING (ADL): PREVIOUS_RESPONSIBILITIES: MEAL PREP;HOUSEKEEPING;LAUNDRY;SHOPPING;YARDWORK;MEDICATION MANAGEMENT;FINANCES;DRIVING;WORK

## 2017-08-29 NOTE — PLAN OF CARE
Problem: Goal Outcome Summary  Goal: Goal Outcome Summary  Outcome: Improving  Day RN  VS monitored, up w/A1 and felix torres C/D/I, CMS+, voiding, hemovac removed, Tylenol/Oxycodone/MS Contin/Vistaril for pain, rates pain moderate but declines Valium or increasing Oxycodone, she knows these are available, home upon d/c, will cont to monitor.

## 2017-08-29 NOTE — PLAN OF CARE
Problem: Hip Replacement, Total (Adult)  Goal: Signs and Symptoms of Listed Potential Problems Will be Absent or Manageable (Hip Replacement, Total)  Signs and symptoms of listed potential problems will be absent or manageable by discharge/transition of care (reference Hip Replacement, Total (Adult) CPG).   Outcome: Improving  PM SHIFT  Pt arrived to room 649 around 1620. Pt lethargic when first arrived but easily arouse able to voice. pts  present on admit and 3 children visited later. LS clear. BS hypo. No flatus. Tolerated clear liquids but no real appetite yet. Pt complains mostly of upper right thigh pain. Ms contin scheduled given and repositioned with relief. Pt ambulated to the BR with assist of 2, walker, and gait belt and was able to void at hs. Pt tolerated it well. Pt given prn atarax for muscle cramps. Plan is home on DC. Pt has Hemovac that only had 20 ml out this shift.

## 2017-08-29 NOTE — PROGRESS NOTES
08/29/17 1103   Quick Adds   Type of Visit Initial PT Evaluation   Living Environment   Lives With spouse;child(celia), dependent;child(celia), adult   Living Arrangements house   Home Accessibility bed and bath are not on the first floor;stairs to enter home;stairs within home   Number of Stairs to Enter Home 5   Number of Stairs Within Home 6   Stair Railings at Home inside, present on left side;outside, present on left side;outside, present on right side   Transportation Available car;family or friend will provide   Living Environment Comment Pt states she lives with her  and two kids in a split level home with 5 steps to enter and 6 steps to the upper level with L railing.    Self-Care   Usual Activity Tolerance good   Current Activity Tolerance good   Equipment Currently Used at Home none   Functional Level Prior   Ambulation 0-->independent   Transferring 0-->independent   Toileting 0-->independent   Bathing 0-->independent   Dressing 0-->independent   Eating 0-->independent   Communication 0-->understands/communicates without difficulty   Swallowing 0-->swallows foods/liquids without difficulty   Cognition 0 - no cognition issues reported   Fall history within last six months no   Which of the above functional risks had a recent onset or change? ambulation;transferring   Prior Functional Level Comment Pt states she was previoulsy independent with all mobility prior. Pt states she had been doing the elliptical at the gym.    General Information   Onset of Illness/Injury or Date of Surgery - Date 08/28/17   Referring Physician Dr. Christoph Lopez   Patient/Family Goals Statement to return home   Pertinent History of Current Problem (include personal factors and/or comorbidities that impact the POC) Pt is POD #1 following R RIA. PMH includes osteoarthritis.   Precautions/Limitations fall precautions;right hip precautions   Weight-Bearing Status - LLE full weight-bearing   Weight-Bearing Status - RLE  weight-bearing as tolerated   General Observations Pt is pleasant and motivated.    Cognitive Status Examination   Orientation orientation to person, place and time   Level of Consciousness alert   Follows Commands and Answers Questions 100% of the time;able to follow multistep instructions   Personal Safety and Judgment intact   Memory intact   Pain Assessment   Patient Currently in Pain Yes, see Vital Sign flowsheet   Posture    Posture Not impaired   Range of Motion (ROM)   ROM Comment Generally WFL, DNT R hip   Strength   Strength Comments Generally 5/5, DNT R hip   Bed Mobility   Bed Mobility Comments Pt requiers min A for supine to/from sit transfers   Transfer Skills   Transfer Comments Pt requires CGA for sit to/from stand transfers   Gait   Gait Comments Pt ambulates 5ft with fww and CGA with antalgic pattern on R LE.    Balance   Balance Comments Good seated, good/fair standing    Modality Interventions   Planned Modality Interventions Cryotherapy   General Therapy Interventions   Planned Therapy Interventions bed mobility training;gait training;neuromuscular re-education;strengthening;transfer training;home program guidelines;progressive activity/exercise   Clinical Impression   Criteria for Skilled Therapeutic Intervention yes, treatment indicated   PT Diagnosis Difficulty with gait   Influenced by the following impairments Pt presents with impaired R hip ROM, strength, and pain with impaired standing balance, and decreased functional capacity.    Functional limitations due to impairments Pt demonstrates increased difficulty with bed mobility, transfers, ambulation, and stairs.    Clinical Presentation Stable/Uncomplicated   Clinical Presentation Rationale Pt is medically stable with supportive family   Clinical Decision Making (Complexity) Low complexity   Therapy Frequency` 2 times/day   Predicted Duration of Therapy Intervention (days/wks) 3 days   Anticipated Equipment Needs at Discharge front  "wheeled walker   Anticipated Discharge Disposition Home with Assist   Risk & Benefits of therapy have been explained Yes   Patient, Family & other staff in agreement with plan of care Yes   [facility] TM \"6 Clicks\"   2016, Trustees of [facility], under license to [employer].  All rights reserved.   6 Clicks Short Forms Basic Mobility Inpatient Short Form   [facility] AM-PAC  \"6 Clicks\" V.2 Basic Mobility Inpatient Short Form   1. Turning from your back to your side while in a flat bed without using bedrails? 3 - A Little   2. Moving from lying on your back to sitting on the side of a flat bed without using bedrails? 3 - A Little   3. Moving to and from a bed to a chair (including a wheelchair)? 3 - A Little   4. Standing up from a chair using your arms (e.g., wheelchair, or bedside chair)? 3 - A Little   5. To walk in hospital room? 3 - A Little   6. Climbing 3-5 steps with a railing? 2 - A Lot   Basic Mobility Raw Score (Score out of 24.Lower scores equate to lower levels of function) 17   Total Evaluation Time   Total Evaluation Time (Minutes) 15     "

## 2017-08-29 NOTE — PLAN OF CARE
Problem: Goal Outcome Summary  Goal: Goal Outcome Summary  OT: Evaluation completed POD#1 R hip with post-op hip precautions     Discharge Planner OT   Patient plan for discharge: home with family assist  Current status: Pt able to review post-op hip precautions prior to activity to improve safety, required Min verbal reminders; pt provided handout on precautions for additional visual aid and handout on AE/DME; pt completed sit<>stand transfers and functional mobility in room with FWW, CGA and verbal cues for technique including WBAT as pt demonstrated limited weightbearing; following education for home set-up pt able to complete comfort height toilet transfer with FWW, CGA, grab bar, CGA following cues for technique while maintaining precautions; pt completed 1 sanding g/h task at sink, FWW, SBA  Barriers to return to prior living situation: none anticipated; needs to address stairs with PT as pt does not have a bathroom on main level  Recommendations for discharge: TBD POD#2  Rationale for recommendations: TBD POD#2       Entered by: Carla Holden 08/29/2017 4:14 PM

## 2017-08-29 NOTE — CONSULTS
Care Transitions Team: Following for CC, discharge planning, and disposition.      Per chart review, SWS consulted for discharge planning.   Pt is POD 1 today with PT assessment and documenting DC plan for pt to return Home with assist of family on discharge.     Will review POD 2 assessment for any changes or concerns.     Katharine Reno RN BSN [facility]  Care Transitions Team  [phone]

## 2017-08-29 NOTE — PLAN OF CARE
Problem: Goal Outcome Summary  Goal: Goal Outcome Summary  Discharge Planner PT      PT: Orders received, chart reviewed, eval completed, and treatment initiated. Pt is POD #1 following R RIA. Pt states she was independent with mobility prior and lives with her  and children in a split level home with 5 steps to enter and 6 steps to either level with L railing.      Patient plan for discharge: Pt states home with assist from her family  Current status: Pt requires min A for supine to/from sit transfers, CGA for sit to/from stand, and CGA for gait for 35ft with fww. Pt instructed in hip precautions throughout mobility and requiring cues to maintain.   Barriers to return to prior living situation: Steps to enter, pain, level of assist  Recommendations for discharge: TBD POD #2  Rationale for recommendations: TBD POD #2        Entered by: Ember Ahumada 08/29/2017 11:32 AM

## 2017-08-29 NOTE — CONSULTS
[facility]  Hospitalist Consult Note  Name: Alpa Vides    MRN: [id number]  YOB: 1965    Age: 51 year old  Date of admission: 8/28/2017  Primary care provider: Rosette Augustine     Requesting Physician:  Dr. Christoph Lopez  Reason for consult:  Post-operative medical management         Assessment and Plan:   Alpa Vides is a 51 year old female with a history of refractory DJD who was admitted for elective right RIA 8/28/17    1. DJD s/p right RIA on 8/28/17: The patient is doing well, currently has well controlled pain and is hemodynamically stable. Will defer diet, activity, DVT prophylaxis, and pain control to the primary team. Currently the patient is on rivaroxaban per primary service. Continue physical and occupational therapy. Social work consulted for possible placement needs. Continue incentive spirometry and check hemoglobin to evaluate for surgical blood loss and potential need for transfusion.     2. ABL anemia:  Pre-op 13.8, post op 11.1 without indication for transfusion    Code status: Full  Prophylaxis: rivaroxaban  [facility]    Thank you for the consultation, will sign off for now as no current medicine needs.  Please page with any questions or concerns.         History of Present Illness:   Alpa Vides is a 51 year old female who was hospitalized for elective RIA for refractory DJD. Pre-operative note was fully reviewed and recommendations acknowledged. Op note and anesthesia notes and flow sheets reviewed. LR 1500  (no UO documented).  Bps borderline low intra-op    The patient had no complications related to the procedure and has had an unremarkable post-operative course to this point. Currently pain is adequately controlled. No nausea, vomiting, diarrhea or constipation. No fevers, chills, diaphoresis. No chest pain, palpitations, dyspnea. Tolerating oral intake. No excessive somnolence and patient is fully alert and  "oriented. The patient has no other complaints at this time.                Past Medical History:     Past Medical History:   Diagnosis Date     Osteoarthritis     hip             Past Surgical History:     Past Surgical History:   Procedure Laterality Date     ARTHROPLASTY HIP Right 8/28/2017    Procedure: ARTHROPLASTY HIP;  Right total hip arthroplasty;  Surgeon: Christoph Lopez MD;  Location: [facility] OR     ROTATOR CUFF REPAIR RT/LT Left                Social History:     Social History   Substance Use Topics     Smoking status: Never Smoker     Smokeless tobacco: Never Used     Alcohol use Yes      Comment: Occas   , works as homemaker           Family History:   Father :  Stroke  Sister:  Breast cancer - age of onset 42  Brother:  Brain cancer-age of onset 57          Allergies:   No Known Allergies          Medications:     Prior to Admission medications    Medication Sig Last Dose Taking? Auth Provider   Naproxen Sodium (ALEVE PO) Take 220 mg by mouth 4 times daily as needed for moderate pain Past Month at Unknown time Yes Reported, Patient   ferrous sulfate (IRON) 325 (65 FE) MG tablet Take 325 mg by mouth every other day 8/27/2017 at Unknown time Yes Reported, Patient   Ascorbic Acid (VITAMIN C PO) Take 250 mg by mouth every other day  8/27/2017 at Unknown time Yes Reported, Patient       Current hospital administered medication list (MAR) also reviewed.          Review of Systems:   A comprehensive greater than 10 system review of systems was carried out.  Pertinent positives and negatives are noted above.  Otherwise negative for contributory info.            Physical Exam:   Blood pressure 103/54, pulse 74, temperature 98.5  F (36.9  C), temperature source Oral, resp. rate 16, height 1.626 m (5' 4\"), weight 69.9 kg (154 lb), SpO2 95 %.  Exam:  GENERAL: No apparent distress. Awake, alert, and fully oriented.  HEENT: Normocephalic, atraumatic. Extraocular movements intact.  CARDIOVASCULAR: Regular rate " and rhythm without murmurs or rubs. No S3.  PULMONARY: Clear bilaterally.  ABDOMINAL: Soft, non-tender, non-distended. Bowel sounds normoactive. No hepatosplenomegaly.  EXTREMITIES: No cyanosis or clubbing. No edema.  NEUROLOGICAL: CN 2-12 grossly intact, no focal neurological deficits.  DERMATOLOGICAL: No rash, ulcer, ecchymoses, jaundice.         Data:       Labs: Personally reviewed.     Recent Labs  Lab 08/29/17  0556   *       Recent Labs  Lab 08/29/17  0556   HGB 11.1*         Hiral Zuniga MD  [facility]

## 2017-08-29 NOTE — PROGRESS NOTES
08/29/17 1406   Quick Adds   Type of Visit Initial Occupational Therapy Evaluation   Living Environment   Lives With child(celia), adult;child(celia), dependent  (senior and 7th grader)   Living Arrangements house   Number of Stairs to Enter Home 5   Number of Stairs Within Home 6   Transportation Available car;family or friend will provide   Living Environment Comment pt lives with  and 2 children. Pt has split entry home, 5 steps to family/kitchen space, 6 additional steps to bed and bathroom (tub) or 6 steps from entry to basement for walk-in shower   Self-Care   Usual Activity Tolerance good   Current Activity Tolerance good   Regular Exercise yes   Equipment Currently Used at Home none   Activity/Exercise/Self-Care Comment pt is independent at baseline, works full time as . Pt was doing eliptical exercises prior to surgery   Functional Level Prior   Ambulation 0-->independent   Transferring 0-->independent   Toileting 0-->independent  (vanity support)   Bathing 0-->independent  (tub shower, curtain )   Dressing 0-->independent   Fall history within last six months no   Prior Functional Level Comment independent at baseline with ADL and all home IADLs   General Information   Onset of Illness/Injury or Date of Surgery - Date 08/28/17   Referring Physician John TIAN   Patient/Family Goals Statement return home and get back to work eventually   Additional Occupational Profile Info/Pertinent History of Current Problem POD#1 R RIA with myrna/direct lateral hip precautions. PMHx osteoarthritis including Left rotator cuff repair. Pt has supportive family who pt reports will be assisting her post-op   Precautions/Limitations fall precautions;right hip precautions   Weight-Bearing Status - RLE weight-bearing as tolerated   General Observations pt demonstrated limited weightbearing through LE, mostly through UEs   Cognitive Status Examination   Orientation orientation to person, place and time   Level  of Consciousness alert   Able to Follow Commands WNL/WFL   Cognitive Comment appropriate during eval, will continue to assess as appropriate   Sensory Examination   Sensory Quick Adds No deficits were identified   Pain Assessment   Patient Currently in Pain Yes, see Vital Sign flowsheet  (3 at rest)   Range of Motion (ROM)   ROM Comment B UE AROM intact   Strength   Strength Comments B UE grossly 5/5   Mobility   Bed Mobility Comments Min A   Transfer Skills   Transfer Comments FWW, CGA, verbal instructions   Transfer Skill: Sit to Stand   Level of [place]: Sit/Stand contact guard   Physical Assist/Nonphysical Assist: Sit/Stand verbal cues   Transfer Skill: Sit to Stand weight-bearing as tolerated   Assistive Device for Transfer: Sit/Stand rolling walker   Transfer Skill: Toilet Transfer   Level of [place]: Toilet contact guard   Physical Assist/Nonphysical Assist: Toilet verbal cues   Weight-Bearing Restrictions: Toilet weight-bearing as tolerated   Assistive Device rolling walker   Balance   Balance Comments impaired balance post-op   Lower Body Dressing   Level of [place]: Dress Lower Body maximum assist (25% patients effort)   Toileting   Level of [place]: Toilet contact guard   Grooming   Level of [place]: Grooming stand-by assist   Instrumental Activities of Daily Living (IADL)   Previous Responsibilities meal prep;housekeeping;laundry;shopping;yardwork;medication management;finances;driving;work   IADL Comments pt reports family will be assisting her during recovery   Activities of Daily Living Analysis   Impairments Contributing to Impaired Activities of Daily Living balance impaired;pain;post surgical precautions;strength decreased   General Therapy Interventions   Planned Therapy Interventions ADL retraining;IADL retraining;transfer training   Clinical Impression   Criteria for Skilled Therapeutic Interventions Met yes, treatment indicated   OT Diagnosis impaired ability to manage  "ADL/IADLS   Influenced by the following impairments post-op precautions, pain, impaired activity tolerance     Assessment of Occupational Performance 3-5 Performance Deficits   Identified Performance Deficits impaired ability to manage dressing tasks, bathing tasks, standing self cares, home management   Clinical Decision Making (Complexity) Low complexity   Therapy Frequency daily   Predicted Duration of Therapy Intervention (days/wks) 3 days   Anticipated Equipment Needs at Discharge dressing equipment   Anticipated Discharge Disposition Home with Assist   Risks and Benefits of Treatment have been explained. Yes   Patient, Family & other staff in agreement with plan of care Yes   Clinical Impression Comments pt demonstrates ability to benefit from skilled OT services to improve safety and independence   [facility] TM \"6 Clicks\"   2016, Trustees of [facility], under license to [employer].  All rights reserved.   6 Clicks Short Forms Daily Activity Inpatient Short Form   [facility]  \"6 Clicks\" Daily Activity Inpatient Short Form   1. Putting on and taking off regular lower body clothing? 2 - A Lot   2. Bathing (including washing, rinsing, drying)? 2 - A Lot   3. Toileting, which includes using toilet, bedpan or urinal? 3 - A Little   4. Putting on and taking off regular upper body clothing? 4 - None   5. Taking care of personal grooming such as brushing teeth? 3 - A Little   6. Eating meals? 4 - None   Daily Activity Raw Score (Score out of 24.Lower scores equate to lower levels of function) 18   Total Evaluation Time   Total Evaluation Time (Minutes) 10     "

## 2017-08-30 ENCOUNTER — APPOINTMENT (OUTPATIENT)
Dept: PHYSICAL THERAPY | Facility: CLINIC | Age: 52
DRG: 470 | End: 2017-08-30
Attending: ORTHOPAEDIC SURGERY
Payer: COMMERCIAL

## 2017-08-30 ENCOUNTER — APPOINTMENT (OUTPATIENT)
Dept: OCCUPATIONAL THERAPY | Facility: CLINIC | Age: 52
DRG: 470 | End: 2017-08-30
Attending: ORTHOPAEDIC SURGERY
Payer: COMMERCIAL

## 2017-08-30 VITALS
DIASTOLIC BLOOD PRESSURE: 55 MMHG | HEART RATE: 81 BPM | HEIGHT: 64 IN | WEIGHT: 154 LBS | SYSTOLIC BLOOD PRESSURE: 111 MMHG | RESPIRATION RATE: 16 BRPM | BODY MASS INDEX: 26.29 KG/M2 | OXYGEN SATURATION: 95 % | TEMPERATURE: 98.9 F

## 2017-08-30 LAB
GLUCOSE SERPL-MCNC: 103 MG/DL (ref 70–99)
HGB BLD-MCNC: 10.1 G/DL (ref 11.7–15.7)

## 2017-08-30 PROCEDURE — 97535 SELF CARE MNGMENT TRAINING: CPT | Mod: GO | Performed by: STUDENT IN AN ORGANIZED HEALTH CARE EDUCATION/TRAINING PROGRAM

## 2017-08-30 PROCEDURE — 85018 HEMOGLOBIN: CPT | Performed by: ORTHOPAEDIC SURGERY

## 2017-08-30 PROCEDURE — 97116 GAIT TRAINING THERAPY: CPT | Mod: GP

## 2017-08-30 PROCEDURE — 40000133 ZZH STATISTIC OT WARD VISIT: Performed by: STUDENT IN AN ORGANIZED HEALTH CARE EDUCATION/TRAINING PROGRAM

## 2017-08-30 PROCEDURE — 36415 COLL VENOUS BLD VENIPUNCTURE: CPT | Performed by: ORTHOPAEDIC SURGERY

## 2017-08-30 PROCEDURE — 25000132 ZZH RX MED GY IP 250 OP 250 PS 637: Performed by: ORTHOPAEDIC SURGERY

## 2017-08-30 PROCEDURE — 82947 ASSAY GLUCOSE BLOOD QUANT: CPT | Performed by: ORTHOPAEDIC SURGERY

## 2017-08-30 PROCEDURE — 40000193 ZZH STATISTIC PT WARD VISIT

## 2017-08-30 PROCEDURE — 97110 THERAPEUTIC EXERCISES: CPT | Mod: GP

## 2017-08-30 PROCEDURE — 97530 THERAPEUTIC ACTIVITIES: CPT | Mod: GP

## 2017-08-30 RX ORDER — HYDROXYZINE HYDROCHLORIDE 25 MG/1
25 TABLET, FILM COATED ORAL EVERY 6 HOURS PRN
Qty: 120 TABLET | Refills: 0 | Status: SHIPPED | OUTPATIENT
Start: 2017-08-30

## 2017-08-30 RX ORDER — ACETAMINOPHEN 325 MG/1
650 TABLET ORAL EVERY 4 HOURS PRN
Qty: 100 TABLET | Refills: 1 | Status: SHIPPED | OUTPATIENT
Start: 2017-08-31

## 2017-08-30 RX ORDER — ASPIRIN 325 MG
325 TABLET, DELAYED RELEASE (ENTERIC COATED) ORAL 2 TIMES DAILY WITH MEALS
Qty: 120 TABLET | Refills: 0 | Status: SHIPPED | OUTPATIENT
Start: 2017-08-31

## 2017-08-30 RX ORDER — OXYCODONE HYDROCHLORIDE 5 MG/1
5-10 TABLET ORAL
Qty: 80 TABLET | Refills: 0 | Status: SHIPPED | OUTPATIENT
Start: 2017-08-30

## 2017-08-30 RX ADMIN — HYDROXYZINE HYDROCHLORIDE 25 MG: 25 TABLET ORAL at 07:03

## 2017-08-30 RX ADMIN — OXYCODONE HYDROCHLORIDE 5 MG: 5 TABLET ORAL at 07:03

## 2017-08-30 RX ADMIN — HYDROXYZINE HYDROCHLORIDE 25 MG: 25 TABLET ORAL at 14:12

## 2017-08-30 RX ADMIN — SENNOSIDES AND DOCUSATE SODIUM 2 TABLET: 8.6; 5 TABLET ORAL at 07:53

## 2017-08-30 RX ADMIN — OXYCODONE HYDROCHLORIDE 5 MG: 5 TABLET ORAL at 14:12

## 2017-08-30 RX ADMIN — ACETAMINOPHEN 975 MG: 325 TABLET, FILM COATED ORAL at 10:00

## 2017-08-30 RX ADMIN — ACETAMINOPHEN 975 MG: 325 TABLET, FILM COATED ORAL at 01:28

## 2017-08-30 RX ADMIN — OXYCODONE HYDROCHLORIDE 5 MG: 5 TABLET ORAL at 01:28

## 2017-08-30 RX ADMIN — RIVAROXABAN 10 MG: 10 TABLET, FILM COATED ORAL at 07:53

## 2017-08-30 RX ADMIN — OXYCODONE HYDROCHLORIDE 5 MG: 5 TABLET ORAL at 17:01

## 2017-08-30 NOTE — PROGRESS NOTES
Reviewed discharge instructions and medications with patient and . Questions answered. Patient discharged to home with , discharge instructions, medications (Atarax, Tylenol, Oxycodone, and Aspirin), and belongings at this time.

## 2017-08-30 NOTE — DISCHARGE INSTRUCTIONS
After your hysterectomy, call your physician if you have:  1. Fever greater than 100.5 degrees.  2. Bleeding that saturates more than one pad in one hour.  3. Unusual incisional drainage or redness.  4. Pain not controlled with oral pain medications.  5. If no bowel movement in three days try Milk of Magnesia or any over the counter remedy.  6. Any other questions or concerns related to your surgery or recovery.    Thank you for allowing [facility] to participate in your cares!

## 2017-08-30 NOTE — PLAN OF CARE
Problem: Goal Outcome Summary  Goal: Goal Outcome Summary  Discharge Planner PT   Patient plan for discharge: Pt states home with assist from her family  Current status: Pt requires min A for supine to/from sit transfers. Is able to perform sit to supine with CGA and cues to sequence. Sit to/from stand with CGA. Ambulates 85' with FWW and SBA. Performs 8 stairs with bilateral rail and CGA.   Barriers to return to prior living situation: None anticipated.  able to assist at home.   Recommendations for discharge: Anticipate safe discharge to home with FWW use and  assist.  Rationale for recommendations: Patient moving well with FWW. Would benefit from continued acute care PT to maximize independence with mobility prior to discharge.        Entered by: Kdoak Carrillo 08/30/2017 12:12 PM

## 2017-08-30 NOTE — PLAN OF CARE
Problem: Goal Outcome Summary  Goal: Goal Outcome Summary  Pt up with 1 assist, gait belt, and walker to and from bathroom. LS clear, BS present, and passing minimal flatus. CMS intact with no numbness/tingling. Drsg CDI. Voiding adequately. IS encouraged along with ankle pump exercises. Pain partially controlled with tylenol, vistaril, and oxycodone. MS Contin also scheduled.

## 2017-08-30 NOTE — PROGRESS NOTES
[facility]    Orthopedics  Daily Post-Op Note    Assessment & Plan   Procedure(s):  ARTHROPLASTY HIP, RIGHT   -2 Days Post-Op  Patient states pain is better controlled today. She is steadily progressing with therapy. She denies other concerns at this time.   Plan:  Continue therapy, pain control, and Xarelto today. Patient will likely be able to discharge home this evening, pending progress with therapy. She will begin BID ECASA 325mg on her first day home (Xarelto completed in hospital). Follow-up in 2 weeks.     Marlys Pacheco        Physical Exam   Temp: 99.2  F (37.3  C) Temp src: Oral BP: 96/53 Pulse: 81 Heart Rate: 69 Resp: 16 SpO2: 94 % O2 Device: None (Room air)    Vitals:    08/28/17 1136   Weight: 69.9 kg (154 lb)     Vital Signs with Ranges  Temp:  [97.3  F (36.3  C)-99.2  F (37.3  C)] 99.2  F (37.3  C)  Pulse:  [72-81] 81  Heart Rate:  [67-69] 69  Resp:  [16-18] 16  BP: ()/(51-56) 96/53  SpO2:  [94 %-98 %] 94 %  I/O last 3 completed shifts:  In: 963 [P.O.:960; I.V.:3]  Out: 0     Dressing shows scant blood. Calves soft. NV intact distally.     Medications     dextrose 5% and 0.45% NaCl + KCl 20 mEq/L 125 mL/hr at 08/28/17 1632        sodium chloride (PF)  3 mL Intracatheter Q8H     rivaroxaban ANTICOAGULANT  10 mg Oral Daily     acetaminophen  975 mg Oral Q8H     morphine  15 mg Oral Q12H     senna-docusate  1-2 tablet Oral BID       Data     Recent Labs  Lab 08/30/17  0558 08/29/17  0556   HGB 10.1* 11.1*

## 2017-08-30 NOTE — PLAN OF CARE
Problem: Goal Outcome Summary  Goal: Goal Outcome Summary  Outcome: Improving  VSS. Alert and Oriented. Up with Assist of 1/walker, walked halls this shift. Pain 6/10 and went down to 3/10. Oxycodone, Atarax, OxyContin and tylenol given this shift.

## 2017-08-30 NOTE — PLAN OF CARE
Problem: Goal Outcome Summary  Goal: Goal Outcome Summary  Outcome: Improving  Day RN  VS monitored, low grade temp, up w/SBA and ww, drsg C/D/I, CMS+, voiding, BS+, flatus+, Tylenol/Oxycodone/Vistaril for pain, home tonight w/family, will cont to monitor.

## 2017-08-30 NOTE — PLAN OF CARE
Problem: Goal Outcome Summary  Goal: Goal Outcome Summary     Discharge Planner OT   Patient plan for discharge: home with family assist  Current status: Pt able to demonstrate progress with tolerating sitting position in order to complete total body dressing tasks following demonstration and verbal instructions for use of AE in order to maintain precautions; pt completed sit<>stand transfers several times, FWW, close SBA; pt able to complete functional mobility in room with FWW, SBA in order to complete toilet transfer, comfort height, FWW, grab bar, SBA and 3 standing g/h tasks, FWW, SBA; pt completed 1 sanding g/h task at sink, FWW, SBA  Barriers to return to prior living situation: none anticipated; needs to address stairs with PT as pt does not have a bathroom on main level  Recommendations for discharge: return home with family assist for ADL/IADLs, pt may benefit from obtaining AE  Rationale for recommendations: anticipate one more session to address tub transfer and standard height toilet       Entered by: Carla oHlden 08/30/2017 8:23 AM

## 2017-08-30 NOTE — PLAN OF CARE
Problem: Goal Outcome Summary  Goal: Goal Outcome Summary     Discharge Planner OT   Patient plan for discharge: home with family assist  Current status: pt able to complete walk-in shower transfer, FWW, CGA for safety following verbal instructions and demonstration for safety while maintaining precautions; pt completed standard height toilet, FWW, vanity support, close SBA, able to maintain precautions safely; throughout session pt required verbal cues for precautions, at end of session reviewed precautions and importance for safety and healing process  Barriers to return to prior living situation: none anticipated; needs to address stairs with PT as pt does not have a bathroom on main level  Recommendations for discharge: return home with family assist for ADL/IADLs, pt may benefit from obtaining AE  Rationale for recommendations: pt has met all OT goals, recommend family support as needed for ADL/IADLs       Entered by: Carla Holden 08/30/2017 4:30 PM         Occupational Therapy Discharge Summary    Reason for therapy discharge:    Discharged to home.  All goals and outcomes met, no further needs identified.    Progress towards therapy goal(s). See goals on Care Plan in [facility] electronic health record for goal details.  Goals met    Therapy recommendation(s):    No further therapy is recommended.

## 2017-08-31 NOTE — OP NOTE
DATE OF PROCEDURE:  08/28/2017       PREOPERATIVE DIAGNOSIS:  Right hip primary osteoarthritis.      POSTOPERATIVE DIAGNOSIS:  Right hip primary osteoarthritis.      PROCEDURE:  Right total hip arthroplasty.      ASSISTANT:  German Pacheco PA-C       ESTIMATED BLOOD LOSS:  150 mL.        SPECIMENS:  None.      DRAINS:  One Hemovac.      COMPLICATIONS:  None apparent.      COMPONENT SIZE:  52 acetabular shell, size 4 femoral stem, 36 + 0 mm head.      INDICATIONS:  Alpa Vides is a 51-year-old female who has had longstanding right hip pain.  She attempted a corticosteroid injection, which gave her short-term relief of her symptoms.  Her symptoms did escalate it was affecting her activities of daily living.  She elected for total hip arthroplasty.  She had grade IV changes noted on x-ray.      DESCRIPTION OF PROCEDURE:  Ms. Vides was brought to the operating room, placed supine on the operating room table.  General endotracheal anesthesia was administered.  Her right lower extremity was prepped and draped in the usual sterile fashion for total hip replacement.  After a timeout confirming appropriate limb, standard lateral incision over the greater trochanter was undertaken with sharp dissection down to the IT band.  East-West was placed, and the abductors were identified.  The anterior one-third of the abductors were released and retracted medially, and a capsulotomy was performed.  The hip was then easily dislocated, and osteotomy of the head was performed.  We were then able to perform a little more of our capsulotomy and placed our retractors around the acetabulum.  She had minimal osteophytes around the acetabulum, but she had quite the calcified labrum.  This was excised.  We then removed the pulvinar.  We began reaming, starting with a 44.  We reamed to medialize.  We then started to enlarge the acetabulum using sequential reamers up to a size 52.  We did use the 53 reamer just to touch the outer rim.  We did do a  trial, and we had excellent fit of the cup.  With that completed, we then placed the final cup into position, placed 2 screws using 25 mm screws and had excellent purchase.  We then used a standard sterile liner.  This was impacted without difficulty.  With this completed, we then turned our attention to the femur.  We used our distal femoral retractor to visualize the femoral canal.  We used our cookie cutter lateralizer and canal finder to open up the canal.  We then broached.  Starting with 0, we broached up to 4.  At this point, we had an intraoperative x-ray taken with our +36 mm head in place.  This gave us an excellent fit and limb length.  We did again evaluate the stability of the hip, and she had excellent stability as well.  With that completed, we again dislocated the hip.  We impacted the final size 4 stem, and without difficulty placed the 36 mm head.  We did relocated again and were happy with our limb lengths.  We did do a 3-minute Betadine soak; we then also injected our local medicine after this was irrigated out.  We then closed in standard fashion, starting with the hip abductors.  These were repaired with #5 Ethibond and interrupted 0 Ethibond.  We then placed a drain.  We closed the IT band with interrupted #1 Vicryl.  We then used 2-0 on the skin as well as staples.  Sterile dressings were applied.  She was then brought to PACU in stable condition.  Needle and sponge counts were correct.      PLAN:  The patient will be weightbear as tolerated.  She will be admitted for pain control.  She will be given 24 hours of antibiotics.  She was given Ancef within 1 hour of the procedure.  She will be started on Xarelto and pneumo boots for deep vein thrombosis prophylaxis.         ARMANDO EDMOND MD             D: 2017 09:14   T: 2017 11:11   MT: [id number]      Name:     ALVARO VALERIO   MRN:      [id number]        Account:        [id number]   :      1965           Procedure Date:  08/28/2017      Document: [id number]       [facility]

## 2017-08-31 NOTE — PLAN OF CARE
Problem: Goal Outcome Summary  Goal: Goal Outcome Summary  Physical Therapy Discharge Summary     Reason for therapy discharge:    Discharged to home.     Progress towards therapy goal(s). See goals on Care Plan in Epic electronic health record for goal details.  Goals partially met.  Barriers to achieving goals:   limited tolerance for therapy and discharge from facility. Goal 1 partially, met ( min A needed), goal 2 not met, needs CGA, Goal 3 and 4 partially met.      Therapy recommendation(s):    Continued therapy is recommended.  Rationale/Recommendations:  It was recommended pt dischage to acute rehab, but pt prefers dc home w/ spouse. .

## 2017-09-03 NOTE — DISCHARGE SUMMARY
ADMIT DATE:  08/28/2017.       DISCHARGE DATE:  08/30/2017.       ADMIT DIAGNOSIS:  Osteoarthrosis of the right hip.      DISCHARGE DIAGNOSIS:  Status post right total hip arthroplasty.      PROCEDURE:  Right total hip arthroplasty performed by Dr. Christoph Lopez at [facility] on 08/28/2017.      INDICATIONS FOR ADMISSION:  Alpa Vides was admitted for postoperative observation, care and pain control following her total hip arthroplasty.      HOSPITAL COURSE:  On 08/28/2017, the patient underwent a right total hip arthroplasty performed by Dr. Christoph Lopez at [facility].  She tolerated the procedure well and was transferred up to the medical/surgical floor in stable condition.  During her stay, her vital signs remained stable.  Her hemoglobin dropped to a low of 10.1 postoperatively and she required no blood transfusions.  Physical and occupational therapy were consulted, and at her time of discharge, she was ambulating well with a front-wheeled walker.  DVT and PE prophylaxis included early ambulation, lower extremity compression devices, lower extremity pneumatic devices and oral Xarelto.  Incentive spirometry was utilized for pneumonia prophylaxis.  On 08/30/2017, the patient was in stable condition with pain well controlled on oral pain medication and she was discharged home.      DISCHARGE INSTRUCTIONS:  On 08/30/2017, the patient was discharged home with the following instructions:   1.  Weightbearing as tolerated.   2.  Ice to the hip p.r.n. pain and swelling.   3.  Keep incision clean and dry.   4.  Avoid active abduction of the hip.      DISCHARGE MEDICATIONS:  Orthopedic medications at time of discharge included:   1.  Oxycodone 5-10 mg by mouth every 3 hours as needed for moderate to severe pain.   2.  Acetaminophen 650 mg by mouth every 4 hours as needed for pain.   3.  Enteric-coated aspirin 325 mg twice daily.      FOLLOW UP:  The patient will follow up at [facility]  Orth[facility] in 2 weeks.         ARMANDO EDMOND MD       As dictated by SERGIO LEI PA-C            D: 2017 12:10   T: 2017 12:34   MT: [id number]      Name:     ALVARO VALERIO   MRN:      [id number]        Account:        [id number]   :      1965           Admit Date:                                       Discharge Date: 2017      Document: [id number]       cc: Armando Edmond MD

## 2020-01-23 ENCOUNTER — TRANSFERRED RECORDS (OUTPATIENT)
Dept: HEALTH INFORMATION MANAGEMENT | Facility: CLINIC | Age: 55
End: 2020-01-23

## 2023-02-16 ENCOUNTER — TRANSFERRED RECORDS (OUTPATIENT)
Dept: HEALTH INFORMATION MANAGEMENT | Facility: CLINIC | Age: 58
End: 2023-02-16

## 2024-01-02 ENCOUNTER — TRANSFERRED RECORDS (OUTPATIENT)
Dept: HEALTH INFORMATION MANAGEMENT | Facility: CLINIC | Age: 59
End: 2024-01-02

## 2024-02-12 ENCOUNTER — TRANSFERRED RECORDS (OUTPATIENT)
Dept: HEALTH INFORMATION MANAGEMENT | Facility: CLINIC | Age: 59
End: 2024-02-12

## 2024-04-08 ENCOUNTER — TRANSFERRED RECORDS (OUTPATIENT)
Dept: HEALTH INFORMATION MANAGEMENT | Facility: CLINIC | Age: 59
End: 2024-04-08

## 2024-06-17 ENCOUNTER — TRANSFERRED RECORDS (OUTPATIENT)
Dept: HEALTH INFORMATION MANAGEMENT | Facility: CLINIC | Age: 59
End: 2024-06-17

## (undated) DEVICE — GLOVE PROTEXIS POWDER FREE 7.5 ORTHOPEDIC 2D73ET75

## (undated) DEVICE — SYR BULB IRRIG DOVER 60 ML LATEX FREE 67000

## (undated) DEVICE — DRSG AQUACEL AG 3.5X12" HYDROFIBER 420670

## (undated) DEVICE — SYR 30ML LL W/O NDL 302832

## (undated) DEVICE — LINEN DRAPE 54X72" 5467

## (undated) DEVICE — BAG CLEAR TRASH 1.3M 39X33" P4040C

## (undated) DEVICE — ESU ELEC BLADE 6" COATED E1450-6

## (undated) DEVICE — PREP CHLORAPREP 26ML TINTED ORANGE  260815

## (undated) DEVICE — SOL NACL 0.9% IRRIG 1000ML BOTTLE 2F7124

## (undated) DEVICE — SU VICRYL 2-0 CT-1 27" UND J259H

## (undated) DEVICE — PREP POVIDONE IODINE SOLUTION 10% 120ML

## (undated) DEVICE — SPONGE LAP 18X18" X8435

## (undated) DEVICE — BLADE SAW SAGITTAL STRK 25X90X1.27MM HD SYS 6 6125-127-090

## (undated) DEVICE — IMM PILLOW ABDUCT HIP MED M60-025-M

## (undated) DEVICE — SUCTION MANIFOLD NEPTUNE 2 SYS 4 PORT 0702-020-000

## (undated) DEVICE — LINEN ORTHO PACK 5446

## (undated) DEVICE — LINEN FULL SHEET 5511

## (undated) DEVICE — NDL SPINAL 18GA 3.5" 405184

## (undated) DEVICE — LINEN HALF SHEET 5512

## (undated) DEVICE — DRAIN HEMOVAC RESERVOIR KIT 10FR 1/8" MED 00-2550-002-10

## (undated) DEVICE — DRAPE CONVERTORS U-DRAPE 60X72" 8476

## (undated) DEVICE — SU ETHIBOND 5 LRDA 30" B499T

## (undated) DEVICE — GLOVE PROTEXIS POWDER FREE 8.5 ORTHOPEDIC 2D73ET85

## (undated) DEVICE — SU VICRYL 1 CT-1 27" J341H

## (undated) DEVICE — DRAPE IOBAN INCISE 23X17" 6650EZ

## (undated) DEVICE — SOL WATER IRRIG 1000ML BOTTLE 2F7114

## (undated) DEVICE — GLOVE PROTEXIS W/NEU-THERA 8.5  2D73TE85

## (undated) DEVICE — DEVICE RETRIEVER HEWSON 71111579

## (undated) DEVICE — PITCHER STERILE 1000ML  SSK9004A

## (undated) DEVICE — GLOVE PROTEXIS POWDER FREE 8.0 ORTHOPEDIC 2D73ET80

## (undated) DEVICE — PACK TOTAL HIP RIDGES LATEX PO15HIFSG

## (undated) RX ORDER — BUPIVACAINE HYDROCHLORIDE AND EPINEPHRINE 2.5; 5 MG/ML; UG/ML
INJECTION, SOLUTION EPIDURAL; INFILTRATION; INTRACAUDAL; PERINEURAL
Status: DISPENSED
Start: 2017-08-28

## (undated) RX ORDER — DEXAMETHASONE SODIUM PHOSPHATE 4 MG/ML
INJECTION, SOLUTION INTRA-ARTICULAR; INTRALESIONAL; INTRAMUSCULAR; INTRAVENOUS; SOFT TISSUE
Status: DISPENSED
Start: 2017-08-28

## (undated) RX ORDER — PROPOFOL 10 MG/ML
INJECTION, EMULSION INTRAVENOUS
Status: DISPENSED
Start: 2017-08-28

## (undated) RX ORDER — KETAMINE HYDROCHLORIDE 10 MG/ML
INJECTION, SOLUTION INTRAMUSCULAR; INTRAVENOUS
Status: DISPENSED
Start: 2017-08-28

## (undated) RX ORDER — GLYCOPYRROLATE 0.2 MG/ML
INJECTION INTRAMUSCULAR; INTRAVENOUS
Status: DISPENSED
Start: 2017-08-28

## (undated) RX ORDER — ONDANSETRON 2 MG/ML
INJECTION INTRAMUSCULAR; INTRAVENOUS
Status: DISPENSED
Start: 2017-08-28

## (undated) RX ORDER — FENTANYL CITRATE 50 UG/ML
INJECTION, SOLUTION INTRAMUSCULAR; INTRAVENOUS
Status: DISPENSED
Start: 2017-08-28

## (undated) RX ORDER — CEFAZOLIN SODIUM 2 G/100ML
INJECTION, SOLUTION INTRAVENOUS
Status: DISPENSED
Start: 2017-08-28

## (undated) RX ORDER — CEFAZOLIN SODIUM 1 G/3ML
INJECTION, POWDER, FOR SOLUTION INTRAMUSCULAR; INTRAVENOUS
Status: DISPENSED
Start: 2017-08-28

## (undated) RX ORDER — KETOROLAC TROMETHAMINE 30 MG/ML
INJECTION, SOLUTION INTRAMUSCULAR; INTRAVENOUS
Status: DISPENSED
Start: 2017-08-28

## (undated) RX ORDER — LIDOCAINE HYDROCHLORIDE 10 MG/ML
INJECTION, SOLUTION EPIDURAL; INFILTRATION; INTRACAUDAL; PERINEURAL
Status: DISPENSED
Start: 2017-08-28

## (undated) RX ORDER — NEOSTIGMINE METHYLSULFATE 5 MG/5 ML
SYRINGE (ML) INTRAVENOUS
Status: DISPENSED
Start: 2017-08-28